# Patient Record
Sex: FEMALE | URBAN - METROPOLITAN AREA
[De-identification: names, ages, dates, MRNs, and addresses within clinical notes are randomized per-mention and may not be internally consistent; named-entity substitution may affect disease eponyms.]

---

## 2021-11-18 ENCOUNTER — OFFICE VISIT (OUTPATIENT)
Dept: URBAN - METROPOLITAN AREA CLINIC 15 | Facility: CLINIC | Age: 66
End: 2021-11-18
Payer: COMMERCIAL

## 2021-11-18 DIAGNOSIS — Z79.84 LONG TERM (CURRENT) USE OF ORAL HYPOGLYCEMIC DRUGS: ICD-10-CM

## 2021-11-18 DIAGNOSIS — E11.9 TYPE II DM WITHOUT COMPLICATION: Primary | ICD-10-CM

## 2021-11-18 DIAGNOSIS — H25.13 AGE-RELATED NUCLEAR CATARACT, BILATERAL: ICD-10-CM

## 2021-11-18 DIAGNOSIS — H52.4 PRESBYOPIA: ICD-10-CM

## 2021-11-18 PROCEDURE — 92004 COMPRE OPH EXAM NEW PT 1/>: CPT | Performed by: OPTOMETRIST

## 2021-11-18 ASSESSMENT — INTRAOCULAR PRESSURE
OS: 12
OD: 12

## 2021-11-18 ASSESSMENT — VISUAL ACUITY
OS: 20/20
OD: 20/20

## 2021-11-18 NOTE — IMPRESSION/PLAN
Impression: Type II DM without complication: H11.2. Plan: Educated patient on exam findings and importance of good control of blood glucose, regular physical activity, and monitoring by PCP and endocrinology. Instructed patient to call if any vision changes/loss or metamorphopsia.

## 2023-07-20 ENCOUNTER — OFFICE VISIT (OUTPATIENT)
Dept: URBAN - METROPOLITAN AREA CLINIC 15 | Facility: CLINIC | Age: 68
End: 2023-07-20
Payer: MEDICARE

## 2023-07-20 DIAGNOSIS — Z79.4 LONG TERM (CURRENT) USE OF INSULIN: ICD-10-CM

## 2023-07-20 DIAGNOSIS — H25.13 AGE-RELATED NUCLEAR CATARACT, BILATERAL: ICD-10-CM

## 2023-07-20 DIAGNOSIS — H52.4 PRESBYOPIA: ICD-10-CM

## 2023-07-20 DIAGNOSIS — H40.033 ANATOMICAL NARROW ANGLE, BILATERAL: ICD-10-CM

## 2023-07-20 DIAGNOSIS — E11.9 TYPE 2 DIABETES MELLITUS WITHOUT COMPLICATIONS: Primary | ICD-10-CM

## 2023-07-20 PROCEDURE — 92020 GONIOSCOPY: CPT | Performed by: OPTOMETRIST

## 2023-07-20 PROCEDURE — 99214 OFFICE O/P EST MOD 30 MIN: CPT | Performed by: OPTOMETRIST

## 2023-07-20 ASSESSMENT — VISUAL ACUITY
OS: 20/25
OD: 20/20

## 2023-07-20 ASSESSMENT — INTRAOCULAR PRESSURE
OD: 13
OS: 13

## 2023-07-20 NOTE — IMPRESSION/PLAN
Impression: Anatomical narrow angle, bilateral: H40.033. Plan: Educated patient on condition and risks of glaucoma progression associated with dilation and low-light environments. Recommend YAG PI, discussed risks, benefits, and alternatives of procedure, patient demonstrates understanding and would like to proceed. Discussed that pt should avoid dilation and anti-depression, anti-anxiety, anti-histamine, or other medications contraindicated in angle closure glaucoma until the laser has been performed. Refer for YAG PI OU.

## 2023-07-20 NOTE — IMPRESSION/PLAN
Impression: Type 2 diabetes mellitus without complications: N51.7. Plan: Educated patient on exam findings and importance of good control of blood glucose, regular physical activity, and monitoring by PCP and endocrinology. Instructed patient to call if any vision changes/loss or metamorphopsia.